# Patient Record
Sex: MALE | Race: BLACK OR AFRICAN AMERICAN | Employment: UNEMPLOYED | URBAN - METROPOLITAN AREA
[De-identification: names, ages, dates, MRNs, and addresses within clinical notes are randomized per-mention and may not be internally consistent; named-entity substitution may affect disease eponyms.]

---

## 2024-10-29 DIAGNOSIS — R06.83 SNORING: Primary | ICD-10-CM

## 2024-11-11 ENCOUNTER — HOSPITAL ENCOUNTER (OUTPATIENT)
Dept: SLEEP MEDICINE | Age: 54
Discharge: HOME | End: 2024-11-14
Payer: OTHER GOVERNMENT

## 2024-11-11 PROCEDURE — 95811 POLYSOM 6/>YRS CPAP 4/> PARM: CPT

## 2024-11-15 ENCOUNTER — TELEPHONE (OUTPATIENT)
Dept: SLEEP MEDICINE | Age: 54
End: 2024-11-15

## 2024-12-12 ASSESSMENT — SLEEP AND FATIGUE QUESTIONNAIRES
HOW LIKELY ARE YOU TO NOD OFF OR FALL ASLEEP WHILE LYING DOWN TO REST IN THE AFTERNOON WHEN CIRCUMSTANCES PERMIT: HIGH CHANCE OF DOZING
HOW LIKELY ARE YOU TO NOD OFF OR FALL ASLEEP WHILE SITTING AND READING: HIGH CHANCE OF DOZING
ESS TOTAL SCORE: 20
HOW LIKELY ARE YOU TO NOD OFF OR FALL ASLEEP WHILE SITTING INACTIVE IN A PUBLIC PLACE: MODERATE CHANCE OF DOZING
HOW LIKELY ARE YOU TO NOD OFF OR FALL ASLEEP WHILE SITTING AND TALKING TO SOMEONE: SLIGHT CHANCE OF DOZING
HOW LIKELY ARE YOU TO NOD OFF OR FALL ASLEEP WHILE SITTING QUIETLY AFTER LUNCH WITHOUT ALCOHOL: HIGH CHANCE OF DOZING
HOW LIKELY ARE YOU TO NOD OFF OR FALL ASLEEP WHILE WATCHING TV: HIGH CHANCE OF DOZING
HOW LIKELY ARE YOU TO NOD OFF OR FALL ASLEEP WHEN YOU ARE A PASSENGER IN A CAR FOR AN HOUR WITHOUT A BREAK: HIGH CHANCE OF DOZING
HOW LIKELY ARE YOU TO NOD OFF OR FALL ASLEEP IN A CAR, WHILE STOPPED FOR A FEW MINUTES IN TRAFFIC: MODERATE CHANCE OF DOZING

## 2024-12-16 ENCOUNTER — OFFICE VISIT (OUTPATIENT)
Dept: SLEEP MEDICINE | Age: 54
End: 2024-12-16
Payer: OTHER GOVERNMENT

## 2024-12-16 VITALS
HEART RATE: 79 BPM | HEIGHT: 70 IN | DIASTOLIC BLOOD PRESSURE: 76 MMHG | WEIGHT: 277 LBS | RESPIRATION RATE: 19 BRPM | BODY MASS INDEX: 39.65 KG/M2 | OXYGEN SATURATION: 93 % | TEMPERATURE: 97.4 F | SYSTOLIC BLOOD PRESSURE: 109 MMHG

## 2024-12-16 DIAGNOSIS — E66.9 OBESITY (BMI 35.0-39.9 WITHOUT COMORBIDITY): ICD-10-CM

## 2024-12-16 DIAGNOSIS — G47.26 SHIFT WORK SLEEP DISORDER: ICD-10-CM

## 2024-12-16 DIAGNOSIS — G47.10 HYPERSOMNIA: ICD-10-CM

## 2024-12-16 DIAGNOSIS — R06.83 SNORING: ICD-10-CM

## 2024-12-16 DIAGNOSIS — G47.8 NON-RESTORATIVE SLEEP: ICD-10-CM

## 2024-12-16 DIAGNOSIS — G47.33 OSA (OBSTRUCTIVE SLEEP APNEA): Primary | ICD-10-CM

## 2024-12-16 DIAGNOSIS — G47.00 PERSISTENT DISORDER OF INITIATING OR MAINTAINING SLEEP: ICD-10-CM

## 2024-12-16 DIAGNOSIS — G47.34 NOCTURNAL HYPOXEMIA: ICD-10-CM

## 2024-12-16 PROCEDURE — G2211 COMPLEX E/M VISIT ADD ON: HCPCS | Performed by: NURSE PRACTITIONER

## 2024-12-16 PROCEDURE — 99203 OFFICE O/P NEW LOW 30 MIN: CPT | Performed by: NURSE PRACTITIONER

## 2024-12-16 RX ORDER — ACETAMINOPHEN 500 MG
1000 TABLET ORAL PRN
COMMUNITY

## 2024-12-16 RX ORDER — SILDENAFIL 100 MG/1
TABLET, FILM COATED ORAL
COMMUNITY
Start: 2024-01-30 | End: 2025-01-30

## 2024-12-16 RX ORDER — LISINOPRIL AND HYDROCHLOROTHIAZIDE 20; 25 MG/1; MG/1
TABLET ORAL
COMMUNITY
Start: 2024-07-02

## 2024-12-16 RX ORDER — SIMVASTATIN 40 MG
20 TABLET ORAL
COMMUNITY
Start: 2024-07-02

## 2024-12-16 RX ORDER — ACARBOSE 100 MG/1
100 TABLET ORAL
COMMUNITY
Start: 2024-07-02

## 2024-12-16 RX ORDER — GLIPIZIDE 10 MG/1
20 TABLET ORAL
COMMUNITY
Start: 2024-08-23

## 2024-12-16 RX ORDER — METOPROLOL TARTRATE 50 MG
25 TABLET ORAL
COMMUNITY
Start: 2024-07-02

## 2024-12-16 RX ORDER — FELODIPINE 10 MG/1
10 TABLET, EXTENDED RELEASE ORAL
COMMUNITY
Start: 2024-08-26

## 2024-12-16 NOTE — PATIENT INSTRUCTIONS
Start CPAP 16 cm H2O with nightly compliance  New CPAP set up and supplies ordered  Recommendations as above  Follow-up in 1 year or sooner if needed

## 2024-12-16 NOTE — PROGRESS NOTES
per tablet Take by mouth    metoprolol tartrate (LOPRESSOR) 50 MG tablet Take 0.5 tablets by mouth    sildenafil (VIAGRA) 100 MG tablet TAKE ONE-HALF TABLET BY MOUTH EVERY DAY AS NEEDED FOR ERECTILE DYSFUNCTION; TAKE 1 HOUR BEFORE SEX *MAX 1 DOSE/24 HOURS - LIMIT 18 DOSES/90 DAYS* *90 DAY SUPPLY*    simvastatin (ZOCOR) 40 MG tablet Take 0.5 tablets by mouth     No current facility-administered medications for this visit.           REVIEW OF SYSTEMS:   CONSTITUTIONAL:   There is no history of fever, chills, night sweats, weight loss, weight gain, persistent fatigue, or lethargy/hypersomnolence.   EYES:   Denies problems with eye pain, erythema, blurred vision, or visual field loss.   ENTM:   Denies history of tinnitus, epistaxis, sore throat, hoarseness, or dysphonia.   LYMPH:   Denies swollen glands.   CARDIAC:   No chest pain, pressure, discomfort, palpitations, orthopnea, murmurs, or edema.   GI:   No dysphagia, heartburn reflux, nausea/vomiting, diarrhea, abdominal pain, or bleeding.   :   Denies history of dysuria, hematuria, polyuria, or decreased urine output.   MS:   No history of myalgias, arthralgias, bone pain, or muscle cramps.   SKIN:   No history of rashes, jaundice, cyanosis, nodules, or ulcers.   ENDO:   Negative for heat or cold intolerance.  No history of DM.   PSYCH:   Negative for anxiety, depression, insomnia, hallucinations.   NEURO:   There is no history of AMS, persistent headache, decreased level of consciousness, seizures, or motor or sensory deficits.      PHYSICAL EXAM:    Vitals:    12/16/24 0930   BP: 109/76   Site: Right Upper Arm   Position: Sitting   Pulse: 79   Resp: 19   Temp: 97.4 °F (36.3 °C)   TempSrc: Infrared   SpO2: 93%  Comment: RA   Weight: 125.6 kg (277 lb)   Height: 1.778 m (5' 10\")     Body mass index is 39.75 kg/m².       GENERAL APPEARANCE:   The patient is obese and in no respiratory distress.   HEENT:   PERRL.  Conjunctivae unremarkable.   Nasal mucosa is without

## 2025-04-05 ENCOUNTER — OFFICE VISIT (OUTPATIENT)
Dept: SLEEP MEDICINE | Age: 55
End: 2025-04-05
Payer: OTHER GOVERNMENT

## 2025-04-05 VITALS
HEIGHT: 70 IN | BODY MASS INDEX: 38.51 KG/M2 | OXYGEN SATURATION: 98 % | DIASTOLIC BLOOD PRESSURE: 64 MMHG | RESPIRATION RATE: 14 BRPM | HEART RATE: 64 BPM | WEIGHT: 269 LBS | SYSTOLIC BLOOD PRESSURE: 112 MMHG

## 2025-04-05 DIAGNOSIS — E66.9 OBESITY (BMI 35.0-39.9 WITHOUT COMORBIDITY): ICD-10-CM

## 2025-04-05 DIAGNOSIS — G47.8 NON-RESTORATIVE SLEEP: ICD-10-CM

## 2025-04-05 DIAGNOSIS — G47.33 OSA (OBSTRUCTIVE SLEEP APNEA): Primary | ICD-10-CM

## 2025-04-05 DIAGNOSIS — G47.00 PERSISTENT DISORDER OF INITIATING OR MAINTAINING SLEEP: ICD-10-CM

## 2025-04-05 DIAGNOSIS — G47.34 NOCTURNAL HYPOXEMIA: ICD-10-CM

## 2025-04-05 DIAGNOSIS — G47.10 HYPERSOMNIA: ICD-10-CM

## 2025-04-05 PROCEDURE — G2211 COMPLEX E/M VISIT ADD ON: HCPCS | Performed by: NURSE PRACTITIONER

## 2025-04-05 PROCEDURE — 99213 OFFICE O/P EST LOW 20 MIN: CPT | Performed by: NURSE PRACTITIONER

## 2025-04-05 RX ORDER — DOXYCYCLINE 100 MG/1
100 CAPSULE ORAL 2 TIMES DAILY
COMMUNITY
Start: 2025-04-02 | End: 2025-04-23

## 2025-04-05 ASSESSMENT — SLEEP AND FATIGUE QUESTIONNAIRES
HOW LIKELY ARE YOU TO NOD OFF OR FALL ASLEEP WHEN YOU ARE A PASSENGER IN A CAR FOR AN HOUR WITHOUT A BREAK: HIGH CHANCE OF DOZING
HOW LIKELY ARE YOU TO NOD OFF OR FALL ASLEEP IN A CAR, WHILE STOPPED FOR A FEW MINUTES IN TRAFFIC: MODERATE CHANCE OF DOZING
HOW LIKELY ARE YOU TO NOD OFF OR FALL ASLEEP WHILE SITTING AND READING: HIGH CHANCE OF DOZING
ESS TOTAL SCORE: 18
HOW LIKELY ARE YOU TO NOD OFF OR FALL ASLEEP WHILE SITTING QUIETLY AFTER LUNCH WITHOUT ALCOHOL: HIGH CHANCE OF DOZING
HOW LIKELY ARE YOU TO NOD OFF OR FALL ASLEEP WHILE SITTING INACTIVE IN A PUBLIC PLACE: MODERATE CHANCE OF DOZING
HOW LIKELY ARE YOU TO NOD OFF OR FALL ASLEEP WHILE SITTING AND TALKING TO SOMEONE: MODERATE CHANCE OF DOZING
HOW LIKELY ARE YOU TO NOD OFF OR FALL ASLEEP WHILE LYING DOWN TO REST IN THE AFTERNOON WHEN CIRCUMSTANCES PERMIT: WOULD NEVER DOZE
HOW LIKELY ARE YOU TO NOD OFF OR FALL ASLEEP WHILE WATCHING TV: HIGH CHANCE OF DOZING

## 2025-04-05 NOTE — PATIENT INSTRUCTIONS
Start back on CPAP at 16 cm H2O with nightly compliance strongly encouraged  Recommendations as above  Follow-up in 4 months or sooner if needed

## 2025-04-05 NOTE — PROGRESS NOTES
[facility]  [address]  [place] [address]  [phone]    Patient Name:  Randy Raymond  YOB: 1970      Office Visit 4/5/2025    CHIEF COMPLAINT:    Chief Complaint   Patient presents with    Follow-up    Sleep Apnea    CPAP/BiPAP         HISTORY OF PRESENT ILLNESS:  Patient is a 54 y.o. male seen today for follow up of JEZ.  Diagnostic sleep study on 11/11/2024 with an AHI of 47.1 and lowest oxygen saturation of 61%. He is prescribed cpap therapy with a humidifier set at 16 cm with a full face mask.  He has only tried wearing CPAP 7 times since receiving his CPAP machine.  He reports that he never could get his mask to seal properly.  He does have his headgear but does not have the fullface cushion with him.  He was wearing a fullface that goes over the bridge of the nose.  We fit him for a ResMed F40 and I strongly encouraged him to work with the mask and his seal so he can wear CPAP every night.  His [place] score is still elevated at 18/24.  I again advised him that is very dangerous for him to be driving if he is the sleepy and he should get his sleep apnea controlled before driving.  He does report that he had to have an amputation of his pinky toe due to osteomyelitis.  He denies any other medical changes.  Reports that his weight has consistently been around 275 pounds.  His blood pressure is controlled today.      [place] Sleepiness Scale      4/5/2025    12:44 PM 12/12/2024    10:42 AM   Sleep Medicine   Sitting and reading 3 3   Watching TV 3 3   Sitting, inactive in a public place (e.g. a theatre or a meeting) 2 2   As a passenger in a car for an hour without a break 3 3   Lying down to rest in the afternoon when circumstances permit 0 3   Sitting and talking to someone 2 1   Sitting quietly after a lunch without alcohol 3 3   In a car, while stopped for a few minutes in traffic 2 2   [place] Sleepiness Score 18 20          No past medical history on